# Patient Record
Sex: FEMALE | URBAN - METROPOLITAN AREA
[De-identification: names, ages, dates, MRNs, and addresses within clinical notes are randomized per-mention and may not be internally consistent; named-entity substitution may affect disease eponyms.]

---

## 2021-03-01 ENCOUNTER — OUTPATIENT (OUTPATIENT)
Dept: OUTPATIENT SERVICES | Facility: HOSPITAL | Age: 31
LOS: 1 days | End: 2021-03-01
Payer: MEDICARE

## 2021-03-01 DIAGNOSIS — Z20.828 CONTACT WITH AND (SUSPECTED) EXPOSURE TO OTHER VIRAL COMMUNICABLE DISEASES: ICD-10-CM

## 2021-03-01 LAB — SARS-COV-2 RNA SPEC QL NAA+PROBE: DETECTED

## 2021-03-01 PROCEDURE — C9803: CPT

## 2021-03-01 PROCEDURE — U0005: CPT

## 2021-03-01 PROCEDURE — U0003: CPT

## 2021-03-02 DIAGNOSIS — Z20.828 CONTACT WITH AND (SUSPECTED) EXPOSURE TO OTHER VIRAL COMMUNICABLE DISEASES: ICD-10-CM

## 2024-05-14 PROBLEM — Z00.00 ENCOUNTER FOR PREVENTIVE HEALTH EXAMINATION: Status: ACTIVE | Noted: 2024-05-14

## 2024-05-17 ENCOUNTER — APPOINTMENT (OUTPATIENT)
Dept: MATERNAL FETAL MEDICINE | Facility: CLINIC | Age: 34
End: 2024-05-17
Payer: COMMERCIAL

## 2024-05-17 ENCOUNTER — ASOB RESULT (OUTPATIENT)
Age: 34
End: 2024-05-17

## 2024-05-17 ENCOUNTER — APPOINTMENT (OUTPATIENT)
Dept: ANTEPARTUM | Facility: CLINIC | Age: 34
End: 2024-05-17
Payer: COMMERCIAL

## 2024-05-17 ENCOUNTER — NON-APPOINTMENT (OUTPATIENT)
Age: 34
End: 2024-05-17

## 2024-05-17 VITALS
SYSTOLIC BLOOD PRESSURE: 118 MMHG | RESPIRATION RATE: 17 BRPM | WEIGHT: 189 LBS | OXYGEN SATURATION: 99 % | DIASTOLIC BLOOD PRESSURE: 70 MMHG | BODY MASS INDEX: 34.78 KG/M2 | HEIGHT: 62 IN | HEART RATE: 84 BPM

## 2024-05-17 DIAGNOSIS — O99.281 ENDOCRINE, NUTRITIONAL AND METABOLIC DISEASES COMPLICATING PREGNANCY, FIRST TRIMESTER: ICD-10-CM

## 2024-05-17 DIAGNOSIS — D25.9 LEIOMYOMA OF UTERUS, UNSPECIFIED: ICD-10-CM

## 2024-05-17 DIAGNOSIS — Z78.9 OTHER SPECIFIED HEALTH STATUS: ICD-10-CM

## 2024-05-17 DIAGNOSIS — Z82.49 FAMILY HISTORY OF ISCHEMIC HEART DISEASE AND OTHER DISEASES OF THE CIRCULATORY SYSTEM: ICD-10-CM

## 2024-05-17 DIAGNOSIS — E27.8 OTHER SPECIFIED DISORDERS OF ADRENAL GLAND: ICD-10-CM

## 2024-05-17 DIAGNOSIS — E03.9 ENDOCRINE, NUTRITIONAL AND METABOLIC DISEASES COMPLICATING PREGNANCY, FIRST TRIMESTER: ICD-10-CM

## 2024-05-17 DIAGNOSIS — Z3A.09 9 WEEKS GESTATION OF PREGNANCY: ICD-10-CM

## 2024-05-17 DIAGNOSIS — N83.202 UNSPECIFIED OVARIAN CYST, LEFT SIDE: ICD-10-CM

## 2024-05-17 PROCEDURE — 76801 OB US < 14 WKS SINGLE FETUS: CPT

## 2024-05-17 PROCEDURE — 99205 OFFICE O/P NEW HI 60 MIN: CPT

## 2024-05-17 RX ORDER — FOLIC ACID/MULTIVIT,IRON,MINER 0.4MG-18MG
TABLET ORAL
Refills: 0 | Status: ACTIVE | COMMUNITY

## 2024-05-17 NOTE — VITALS
[LMP (date): ___] : LMP was on [unfilled] [GA =___ Weeks] : which calculates to a GA of [unfilled] weeks [GA= ___ Days] : and [unfilled] day(s) [KALYANI by LMP (date): ___] : The calculated KALYANI by LMP is [unfilled] [By LMP] : this is the final KALYANI

## 2024-05-17 NOTE — FAMILY HISTORY
[Age 35+ During Pregnancy] : not 35 or over during pregnancy [Reported Family History Of Birth Defects] : no congenital heart defects [Pepe-Sachs Carrier] : no Pepe-Sachs [Family History] : no mental retardation/autism [Reported Family History Of Genetic Disease] : no history of child defect in child of baby father

## 2024-05-20 NOTE — DATA REVIEWED
[FreeTextEntry1] :  TAUS/TVUS 5/17/24 - viable hoff IUP with CRL measuring consistent with LMP dating. Two separate cysts are seen in the left adnexal area. Complex appearing left cyst measuring 8.60 x 5.56 x 7.19 cm with internal echogenic debris. No increased color flow noted on Doppler imaging. Smaller left simple cyst is also appreciated measuring 2.15 x 1.64 x 1.50 cm. No increased color flow noted on Doppler imaging. There is a right adnexal cyst measuring 10.24 x 6.85 x 9.96 cm. Cystic fluid appears homogeneous with low level echogenicity. No increased color flow noted on Doppler imaging. Posterior myoma appreciated measuring 3.6 x 3.0 x 3.7 cm.

## 2024-05-20 NOTE — DISCUSSION/SUMMARY
[FreeTextEntry1] : At the time of consultation, we reviewed the result of her ultrasound evaluation to date.  Teagan was noted to have ovarian cysts and uterine fibroids noted at the time of pregnancy dating ultrasound.  She reports some pelvic pressure but denies pain, vaginal bleeding, or discharge. She reports no previous pelvic ultrasound performed outside of pregnancy. The prevalence of adnexal masses in pregnant women is 0.05-3.2% of live births. Malignancy is diagnosed in only 1.2-6.8% of pregnant patients with persistent masses. The general consensus regarding management of adnexal masses in pregnancy is to surgically resect asymptomatic masses that are present after the first trimester and are either >10 cm in diameter or are complex appearing (solid and cystic areas, papillary area, septae, etc.) as these features are more often associated with malignancy. Additionally, resection of large adnexal masses reduces the risk for complication such as adnexal torsion, rupture, or obstruction of labor. We did review that larger masses may be less likely to torse as they become fixed in the pelvis by the growing uterus. As she has one mass that measures >10 cm and another that has complex features, she may be candidate for surgical intervention.  We reviewed that the visualized fibroid does not require surgical intervention at this time. Teagan may also opt for expectant management plan for surgical intervention after delivery, which is a reason alternative.  Surgical interval would be considering in the  period if there is a change in mass size or character or with maternal symptoms; however, this may result in emergent need for surgical intervention at a later gestational age with increased risk for fetal complication or  delivery.    We reviewed the optimal time for surgery during pregnancy is in the early second trimester. This is outside the window where there is an increased risk for first trimester pregnancy loss.  Additionally, the results of genetic screening or testing are available.  Organogenesis is complete and the hormonal function of the corpus lutuem has been replaced by the placenta. Surgery is usually performed by a gynecologic oncologist and may be achieved by either a laparoscopic or open approach.  She is scheduled [place] [facility]. Ancillary imaging, including MRI, is generally not needed but may be requested for surgical planning.  We reviewed the safety of MRI in pregnancy and optimal timing in the second trimester. We also reviewed anesthesia considerations with pregnancy and the need for anesthesia consultation should she opt to purse surgery.  All questions were answered to the best of my ability. At this time, she will proceed with GYNONC consultation as scheduled. She is scheduled for NT evaluation at her OB office and will pursue genetic screening at that time.  She will return to our office in 5 weeks (14 weeks gestation) for repeat ultrasound evaluation and follow up consultation.   For completion, we reviewed her history of hypothyroidism currently treated with levothyroxine 125 mcg daily. This dose was recently increased in April.  She is clinically euthyroid and reports routine care with her Endocrinologist. Well-controlled hypothyroid disorders carry no increased risk of pregnancy complication.  Untreated hypothyroidism or disease with suboptimal control can have adverse effects on the mother and child, depending upon the biochemical severity of the hypothyroidism. Thyroid replacement may need to be adjusted during the course of the pregnancy as the volume of distribution increases.  TSH should be assessed every 4-6 weeks and after any change made in her levothyroxine dose.   TSH should be maintained within the following trimester-specific goal ranges: 0.1-2.5 uIU/mL in the first trimester, 0.2-3 uIU/mL in the second trimester and 0.3-3 uIU/mL in the third trimester. The patient voiced understanding of the above counseling and recommendations.

## 2024-05-20 NOTE — OB HISTORY
[LMP: ___] : LMP: [unfilled] [KALYANI: ___] : KALYANI: [unfilled] [EGA: ___ wks] : EGA: [unfilled] wks [Spontaneous] : Spontaneous conception [Sonogram] : sonogram [at ___ wks] : at [unfilled] weeks [Definite:  ___ (Date)] : the last menstrual period was [unfilled] [FreeTextEntry1] : Newly diagnosed 8.8 cm complex left ovarian cyst, 10 cm adnexal mass, and 3.5 cm fibroid at the time of dating ultrasound Synthroid 125 mcg daily for hypothyroidism

## 2024-05-20 NOTE — HISTORY OF PRESENT ILLNESS
[FreeTextEntry1] : Teagan Diaz is a 34 y.o.  with LMP of 3/15/24 and KALYANI of 24 who presents at 9w0d for  consultation secondary to pregnancy complicated by bilateral ovarian cysts, uterine fibroid, and hypothyroidism. Her BMI is 34.5 kg/m2. Teagan was noted to have ovarian cysts and uterine fibroid at the time of routine dating ultrasound. She is asymptomatic. She was diagnosed with hypothyroidism at the age of 29 and is currently treated with levothyroxine 125 mcg daily. She receives routine care with Endocrinology. On the day of consultation, Teagan feels well and reports no constitutional or obstetric complaint.

## 2024-05-20 NOTE — CHIEF COMPLAINT
[G ___] : G [unfilled] [P ___] : P [unfilled] [de-identified] : pregnancy complicated by bilateral ovarian cysts, uterine fibroid, and hypothyroidism

## 2024-05-24 NOTE — PHYSICAL EXAM
[Chaperone Present] : A chaperone was present in the examining room during all aspects of the physical examination [Absent] : CVAT: absent [Normal] : Recto-Vaginal Exam: Normal [Fully active, able to carry on all pre-disease performance without restriction] : Status 0 - Fully active, able to carry on all pre-disease performance without restriction

## 2024-05-29 ENCOUNTER — APPOINTMENT (OUTPATIENT)
Dept: GYNECOLOGIC ONCOLOGY | Facility: CLINIC | Age: 34
End: 2024-05-29
Payer: COMMERCIAL

## 2024-05-29 ENCOUNTER — NON-APPOINTMENT (OUTPATIENT)
Age: 34
End: 2024-05-29

## 2024-05-29 VITALS
HEART RATE: 84 BPM | HEIGHT: 62 IN | WEIGHT: 189 LBS | SYSTOLIC BLOOD PRESSURE: 116 MMHG | BODY MASS INDEX: 34.78 KG/M2 | DIASTOLIC BLOOD PRESSURE: 78 MMHG

## 2024-05-29 PROCEDURE — 99204 OFFICE O/P NEW MOD 45 MIN: CPT

## 2024-05-30 NOTE — HISTORY OF PRESENT ILLNESS
[FreeTextEntry1] : Referring MD/MFM - Dr. Laura Gonzalez GYN - Dr. Gerson Martines PCP - Dr. Elissa Nelson    Ms. Diaz is a 33 yo  who presents for initial consultation at the request of Dr. Gonzalez for the management of a pelvic mass. She is currently 10 5/7 weeks pregnant with KALYANI 24. Prenatal imaging revealed bilateral adnexal masses as below. She presents for further management.   IMAGING: TVUS on 24: viable single IUP. Two separate cysts seen in the left adnexal area. Complex appearing left cyst 8.6 x 5.56 x 7.19 cm with internal echogenic debris. No increased flow. Small left simple cyst 2.15 x 1.64 x 1.5 cm. No increased flow. There is a right adnexal cyst 10.24 x 6.85 x 9.96 cm. Cystic fluid with homogenous low-level echogenicity. No increased flow. Posterior myoma 3.6 x 3.0 x 3.7 cm.   PMHx: hypothyroidism PSHx: N/A Fam Hx: N/A   HCM: Mammogram: never  Colonoscopy: never COVID-19 vaccine series completed

## 2024-05-30 NOTE — PLAN
[TextEntry] : Bilateral adnexal masses in pregnancy.  Images reviewed from patient's portal. Appear to be large unilocular cysts although I cannot rule out a mural nodule on the left cyst.  Discussed DDx in detail with patient and her .  Discussed role of surgery in pregnancy and its timing.  Will plan for repeat imaging at approximately 13 week and make a decision regarding surgery.

## 2024-05-30 NOTE — DISCUSSION/SUMMARY
[Reviewed Clinical Lab Test(s)] : Results of clinical tests were reviewed. [Reviewed Radiology Report(s)] : Radiology reports were reviewed. [Reviewed Radiology Film/Image(s)] : Images from radiology studies were reviewed and examined. [Discuss Alternatives/Risks/Benefits w/Patient] : All alternatives, risks, and benefits were discussed with the patient/family and all questions were answered.  Patient expressed good understanding and appreciates the importance of follow up as recommended. [FreeTextEntry1] : I sat down with Teagan and her  and reviewed the differential diagnosis of an adnexal mass in detail including benign, borderline and malignant ovarian neoplasms. Review of radiologic imaging suggests a benign process. Definitive diagnosis can only be obtained through surgical excision. Laparoscopy and excision of the adnexal cyst or adnexa with frozen section diagnosis may be recommended for treatment of a mass.  Surgical staging is performed if an ovarian malignancy is diagnosed. I described the staging of ovarian cancer and the importance of surgical staging results for determining prognosis and treatment. She understands that comprehensive staging for ovarian cancer typically includes hysterectomy, bilateral salpingoopherectomy, retroperitoneal lymph node dissection, omentectomy and peritoneal biopsies and may require conversion to laparotomy. We reviewed indications for fertility preserving surgical staging and indications and contraindications for this approach. The risks and benefits of surgery were discussed in detail, including but not limited to risks of bleeding, infection, poor wound healing, venous thromboembolism and intraoperative injury. We discussed expectations for postoperative recuperation after laparoscopy and laparotomy. Alternatives to surgery, namely expectant management, were reviewed. We discussed potential risks of surgery during pregnancy and ideal timing and approach to such.

## 2024-05-31 ENCOUNTER — APPOINTMENT (OUTPATIENT)
Dept: ANTEPARTUM | Facility: CLINIC | Age: 34
End: 2024-05-31

## 2024-05-31 ENCOUNTER — APPOINTMENT (OUTPATIENT)
Dept: MATERNAL FETAL MEDICINE | Facility: CLINIC | Age: 34
End: 2024-05-31

## 2024-06-20 ENCOUNTER — APPOINTMENT (OUTPATIENT)
Dept: ANTEPARTUM | Facility: CLINIC | Age: 34
End: 2024-06-20
Payer: COMMERCIAL

## 2024-06-20 ENCOUNTER — ASOB RESULT (OUTPATIENT)
Age: 34
End: 2024-06-20

## 2024-06-20 ENCOUNTER — APPOINTMENT (OUTPATIENT)
Dept: MATERNAL FETAL MEDICINE | Facility: CLINIC | Age: 34
End: 2024-06-20
Payer: COMMERCIAL

## 2024-06-20 DIAGNOSIS — O26.899 OTHER SPECIFIED PREGNANCY RELATED CONDITIONS, UNSPECIFIED TRIMESTER: ICD-10-CM

## 2024-06-20 DIAGNOSIS — O34.10 MATERNAL CARE FOR BENIGN TUMOR OF CORPUS UTERI, UNSPECIFIED TRIMESTER: ICD-10-CM

## 2024-06-20 DIAGNOSIS — N94.89 OTHER SPECIFIED CONDITIONS ASSOCIATED WITH FEMALE GENITAL ORGANS AND MENSTRUAL CYCLE: ICD-10-CM

## 2024-06-20 DIAGNOSIS — O99.210 OBESITY COMPLICATING PREGNANCY, UNSPECIFIED TRIMESTER: ICD-10-CM

## 2024-06-20 DIAGNOSIS — E03.9 ENDOCRINE, NUTRITIONAL AND METABOLIC DISEASES COMPLICATING PREGNANCY, UNSPECIFIED TRIMESTER: ICD-10-CM

## 2024-06-20 DIAGNOSIS — R19.00 OTHER SPECIFIED PREGNANCY RELATED CONDITIONS, UNSPECIFIED TRIMESTER: ICD-10-CM

## 2024-06-20 DIAGNOSIS — D25.9 MATERNAL CARE FOR BENIGN TUMOR OF CORPUS UTERI, UNSPECIFIED TRIMESTER: ICD-10-CM

## 2024-06-20 DIAGNOSIS — O99.280 ENDOCRINE, NUTRITIONAL AND METABOLIC DISEASES COMPLICATING PREGNANCY, UNSPECIFIED TRIMESTER: ICD-10-CM

## 2024-06-20 DIAGNOSIS — Z3A.14 14 WEEKS GESTATION OF PREGNANCY: ICD-10-CM

## 2024-06-20 PROCEDURE — 76801 OB US < 14 WKS SINGLE FETUS: CPT

## 2024-06-20 PROCEDURE — 99215 OFFICE O/P EST HI 40 MIN: CPT

## 2024-06-20 RX ORDER — LEVOTHYROXINE SODIUM 137 UG/1
137 TABLET ORAL
Refills: 0 | Status: ACTIVE | COMMUNITY

## 2024-06-23 PROBLEM — O34.10 UTERINE FIBROID DURING PREGNANCY, ANTEPARTUM: Status: ACTIVE | Noted: 2024-05-17

## 2024-06-23 PROBLEM — O26.899 PELVIC MASS DURING PREGNANCY: Status: ACTIVE | Noted: 2024-05-24

## 2024-06-23 PROBLEM — O99.210 OBESITY IN PREGNANCY: Status: ACTIVE | Noted: 2024-05-17

## 2024-06-23 PROBLEM — O99.280 HYPOTHYROIDISM IN PREGNANCY: Status: ACTIVE | Noted: 2024-05-17

## 2024-06-23 PROBLEM — Z3A.14 14 WEEKS GESTATION OF PREGNANCY: Status: ACTIVE | Noted: 2024-06-20

## 2024-06-23 PROBLEM — N94.89 ADNEXAL MASS: Status: ACTIVE | Noted: 2024-05-17

## 2024-06-23 NOTE — OB HISTORY
[LMP: ___] : LMP: [unfilled] [KALYANI: ___] : KALYANI: [unfilled] [EGA: ___ wks] : EGA: [unfilled] wks [Spontaneous] : Spontaneous conception [at ___ wks] : at [unfilled] weeks [Sonogram] : sonogram [Definite:  ___ (Date)] : the last menstrual period was [unfilled] [FreeTextEntry1] : Bilateral adnexal cysts noted at time of dating ultrasound MFM consultation 5/17/24 GYNONC consultation 5/29/24 Synthroid increased to 137 mcg daily for hypothyroidism

## 2024-06-23 NOTE — HISTORY OF PRESENT ILLNESS
[FreeTextEntry1] : Teagan Diaz is a 34 y.o.  with LMP of 3/15/24 and KALYANI of 24 who presents at 14w0d for follow up  consultation secondary to pregnancy complicated by bilateral ovarian cysts, uterine fibroid, and hypothyroidism. Her BMI is 34.5 kg/m2. Teagan reports some irregular left sided pain appreciated primarily at night. No vaginal bleeding or discharge.  She is otherwise asymptomatic. She was diagnosed with hypothyroidism at the age of 29 and has routine follow up with Endocrinology. Her levothyroxine replacement was increased to 137 mcg daily. On the day of consultation, Teagan feels well and reports no constitutional or obstetric complaint.

## 2024-06-23 NOTE — DATA REVIEWED
[FreeTextEntry1] : TAUS 6/20/24 - viable hoff IUP with CRL measuring consistent with LMP dating, NT measures 2.3 mm. Complex appearing left cyst measuring 7.3 x 5.1 x 9.2 cm with internal echogenic debris. No increased color flow noted on Doppler imaging. Right adnexal cyst measuring  8.7 x 7.5 x 9.2 cm. Cystic fluid appears homogeneous with low level echogenicity. No increased color flow noted on Doppler imaging. Posterior myoma appreciated measuring 4.3 x 4.1 x 4.5 cm.

## 2024-06-23 NOTE — ACTIVE PROBLEMS
[Diabetes Mellitus] : no diabetes mellitus [Heart Disease] : no heart disease [Hypertension] : no hypertension [Autoimmune Disease] : no autoimmune disease [Renal Disease] : no kidney disease, no UTI [Psychiatric Disorders] : no psychiatric disorders [Neurologic Disorder] : no neurologic disorder, no epilepsy [Depression] : no depression, no post partum depression [Hepatic Disorder] : no hepatitis, no liver disease [Thrombophlebitis] : no varicosities, no phlebitis [Thyroid Disorder] : no thyroid dysfunction [Trauma] : no trauma/violence [Blood Transfusion (___ Ml)] : no history of blood transfusion

## 2024-06-23 NOTE — CHIEF COMPLAINT
[G ___] : G [unfilled] [P ___] : P [unfilled] [de-identified] : pregnancy complicated by bilateral ovarian cysts, uterine fibroid, and hypothyroidism

## 2024-06-23 NOTE — PAST MEDICAL HISTORY
[HIV Infection] : no HIV [Exposure To Gonorrhea] : no gonorrhea [Chlamydial Infections] : no chlamydia [Herpes Simplex] : no genital herpes [Syphilis] : no syphilis [Human Papilloma Virus Infection] : no genital warts [Hepatitis, B Virus] : no Hepatitis B [Trichomoniasis] : no trichomoniasis [Hepatitis, C Virus] : no Hepatitis C

## 2024-06-23 NOTE — DISCUSSION/SUMMARY
[FreeTextEntry1] : At the time of follow up consultation, we reviewed today's ultrasound report which again show bilateral adnexal cysts who appear unchanged from prior imaging. The posterior fibroid has increased minimally in size. She reports some left sided pain but is otherwise asymptomatic. She otherwise reports no cramping, vaginal bleeding, or discharge. We again reviewed the general consensus regarding management of adnexal masses in pregnancy is to surgically resect asymptomatic masses that are present after the first trimester and are either >10 cm in diameter or are complex appearing (solid and cystic areas, papillary area, septae, etc.). Resection of large adnexal masses reduces the risk for complication such as adnexal torsion, rupture, or obstruction of labor. We did review that larger masses may be less likely to torse as they become fixed in the pelvis by the growing uterus. This information was also reviewed with her at the time of GYNONC consultation.  She understands that she remains a candidate for surgical intervention and optimal timing was reviewed. We reviewed that the visualized fibroid does not require surgical intervention at this time. Teagan may also opt for expectant management plan for surgical intervention after delivery, which is a reasonable alternative.  Surgical interval would be considered in the  period if there is a change in mass size or character or with maternal symptoms; however, this may result in emergent need for surgical intervention at a later gestational age with increased risk for fetal complication or  delivery.    We reviewed the optimal time for surgery during pregnancy is in the early second trimester., which is her current gestational age. This is outside the window where there is an increased risk for first trimester pregnancy loss.  Additionally, the results of genetic screening or testing are available.  Organogenesis is complete and the hormonal function of the corpus luteum has been replaced by the placenta. Surgery is usually performed by a gynecologic oncologist and may be achieved by either a laparoscopic or open approach.  She has met with Dr. Burns in the [place] and requests second opinion from an [facility] physician as it is closer to her home. Referral was provided. Anesthesia consultation is also recommended should she opt to pursue surgery.  All questions were answered to the best of my ability. At this time, she would like to continue expectant management and have follow up GYNONC consultation. She will return to our office in 3 weeks for repeat ultrasound evaluation and follow up consultation and at 20 weeks for comprehensive fetal anatomy evaluation.   Finally, Teagan has a history of hypothyroidism and her levothyroxine dose was recently increased to 137 mcg daily.She is clinically euthyroid and reports routine care with her Endocrinologist. TSH should be assessed every 4-6 weeks and after any change made in her levothyroxine dose.   TSH should be maintained within the following trimester-specific goal ranges: 0.1-2.5 uIU/mL in the first trimester, 0.2-3 uIU/mL in the second trimester and 0.3-3 uIU/mL in the third trimester. The patient voiced understanding of the above counseling and recommendations. All questions were answered to the best of my ability.

## 2024-06-26 ENCOUNTER — APPOINTMENT (OUTPATIENT)
Dept: GYNECOLOGIC ONCOLOGY | Facility: CLINIC | Age: 34
End: 2024-06-26

## 2024-06-26 VITALS
HEART RATE: 96 BPM | WEIGHT: 189 LBS | OXYGEN SATURATION: 99 % | HEIGHT: 62 IN | DIASTOLIC BLOOD PRESSURE: 82 MMHG | BODY MASS INDEX: 34.78 KG/M2 | SYSTOLIC BLOOD PRESSURE: 137 MMHG | RESPIRATION RATE: 16 BRPM

## 2024-06-26 DIAGNOSIS — O34.80 MATERNAL CARE FOR OTHER ABNORMALITIES OF PELVIC ORGANS, UNSPECIFIED TRIMESTER: ICD-10-CM

## 2024-06-26 DIAGNOSIS — N83.209 MATERNAL CARE FOR OTHER ABNORMALITIES OF PELVIC ORGANS, UNSPECIFIED TRIMESTER: ICD-10-CM

## 2024-06-26 PROCEDURE — 99214 OFFICE O/P EST MOD 30 MIN: CPT

## 2024-06-26 PROCEDURE — 99459 PELVIC EXAMINATION: CPT

## 2024-07-02 ENCOUNTER — APPOINTMENT (OUTPATIENT)
Dept: MRI IMAGING | Facility: CLINIC | Age: 34
End: 2024-07-02

## 2024-07-05 ENCOUNTER — APPOINTMENT (OUTPATIENT)
Dept: GYNECOLOGIC ONCOLOGY | Facility: CLINIC | Age: 34
End: 2024-07-05

## 2024-07-11 ENCOUNTER — APPOINTMENT (OUTPATIENT)
Dept: ANTEPARTUM | Facility: CLINIC | Age: 34
End: 2024-07-11
Payer: COMMERCIAL

## 2024-07-11 ENCOUNTER — ASOB RESULT (OUTPATIENT)
Age: 34
End: 2024-07-11

## 2024-07-11 ENCOUNTER — APPOINTMENT (OUTPATIENT)
Dept: MATERNAL FETAL MEDICINE | Facility: CLINIC | Age: 34
End: 2024-07-11
Payer: COMMERCIAL

## 2024-07-11 VITALS
WEIGHT: 190 LBS | SYSTOLIC BLOOD PRESSURE: 108 MMHG | HEIGHT: 62 IN | DIASTOLIC BLOOD PRESSURE: 62 MMHG | RESPIRATION RATE: 16 BRPM | OXYGEN SATURATION: 99 % | BODY MASS INDEX: 34.96 KG/M2 | HEART RATE: 91 BPM

## 2024-07-11 DIAGNOSIS — R19.00 OTHER SPECIFIED PREGNANCY RELATED CONDITIONS, UNSPECIFIED TRIMESTER: ICD-10-CM

## 2024-07-11 DIAGNOSIS — O99.210 OBESITY COMPLICATING PREGNANCY, UNSPECIFIED TRIMESTER: ICD-10-CM

## 2024-07-11 DIAGNOSIS — N94.89 OTHER SPECIFIED CONDITIONS ASSOCIATED WITH FEMALE GENITAL ORGANS AND MENSTRUAL CYCLE: ICD-10-CM

## 2024-07-11 DIAGNOSIS — O34.80 MATERNAL CARE FOR OTHER ABNORMALITIES OF PELVIC ORGANS, UNSPECIFIED TRIMESTER: ICD-10-CM

## 2024-07-11 DIAGNOSIS — N83.209 MATERNAL CARE FOR OTHER ABNORMALITIES OF PELVIC ORGANS, UNSPECIFIED TRIMESTER: ICD-10-CM

## 2024-07-11 DIAGNOSIS — O26.899 OTHER SPECIFIED PREGNANCY RELATED CONDITIONS, UNSPECIFIED TRIMESTER: ICD-10-CM

## 2024-07-11 DIAGNOSIS — O34.10 MATERNAL CARE FOR BENIGN TUMOR OF CORPUS UTERI, UNSPECIFIED TRIMESTER: ICD-10-CM

## 2024-07-11 DIAGNOSIS — O99.280 ENDOCRINE, NUTRITIONAL AND METABOLIC DISEASES COMPLICATING PREGNANCY, UNSPECIFIED TRIMESTER: ICD-10-CM

## 2024-07-11 DIAGNOSIS — D25.9 MATERNAL CARE FOR BENIGN TUMOR OF CORPUS UTERI, UNSPECIFIED TRIMESTER: ICD-10-CM

## 2024-07-11 DIAGNOSIS — E03.9 ENDOCRINE, NUTRITIONAL AND METABOLIC DISEASES COMPLICATING PREGNANCY, UNSPECIFIED TRIMESTER: ICD-10-CM

## 2024-07-11 PROCEDURE — 99215 OFFICE O/P EST HI 40 MIN: CPT

## 2024-07-11 PROCEDURE — 76817 TRANSVAGINAL US OBSTETRIC: CPT

## 2024-07-11 PROCEDURE — 76815 OB US LIMITED FETUS(S): CPT

## 2024-07-12 ENCOUNTER — APPOINTMENT (OUTPATIENT)
Dept: GYNECOLOGIC ONCOLOGY | Facility: CLINIC | Age: 34
End: 2024-07-12

## 2024-07-12 PROCEDURE — 99441: CPT | Mod: 93

## 2024-07-15 ENCOUNTER — APPOINTMENT (OUTPATIENT)
Dept: MRI IMAGING | Facility: CLINIC | Age: 34
End: 2024-07-15
Payer: COMMERCIAL

## 2024-07-15 ENCOUNTER — OUTPATIENT (OUTPATIENT)
Dept: OUTPATIENT SERVICES | Facility: HOSPITAL | Age: 34
LOS: 1 days | End: 2024-07-15

## 2024-07-15 DIAGNOSIS — O34.80 MATERNAL CARE FOR OTHER ABNORMALITIES OF PELVIC ORGANS, UNSPECIFIED TRIMESTER: ICD-10-CM

## 2024-07-15 PROCEDURE — 72195 MRI PELVIS W/O DYE: CPT | Mod: 26

## 2024-07-18 ENCOUNTER — NON-APPOINTMENT (OUTPATIENT)
Age: 34
End: 2024-07-18

## 2024-07-29 ENCOUNTER — APPOINTMENT (OUTPATIENT)
Dept: GYNECOLOGIC ONCOLOGY | Facility: CLINIC | Age: 34
End: 2024-07-29
Payer: COMMERCIAL

## 2024-07-29 DIAGNOSIS — N94.89 OTHER SPECIFIED CONDITIONS ASSOCIATED WITH FEMALE GENITAL ORGANS AND MENSTRUAL CYCLE: ICD-10-CM

## 2024-07-29 PROCEDURE — 99214 OFFICE O/P EST MOD 30 MIN: CPT

## 2024-07-29 PROCEDURE — 99459 PELVIC EXAMINATION: CPT

## 2024-07-29 NOTE — ASSESSMENT
[FreeTextEntry1] : Pt is a 35 yo currently 19 wks pregnant with KALYANI of 12/20/24 presenting today for bilateral ovarian cysts. We discussed MRI results and patient asking for some guidance. She is stressed about the results and would like to know what decision to make.   I discussed that while imaging is not definitive, my gut feeling is that this does not represent a high grade cancer, but likely a borderline tumor or possibly just a cystadenofibroma, which is completely benign. Given that she did not have any other imaging prior to pregnancy we do not know how long she has had these cysts. We discussed a <3% risk of torsion while in pregnancy and I do not believe the cysts will interfere with pregnancy.   Risk of surgery would also be infetion and loosing the pregnancy and I would recommend a right oophorectomy and left cystectomy. If she waits and we monitor the cyst we could consider a cystectomy of the right ovary after delivery and given the appearance I would favor more conservative treatment. We did discuss risk of waiting with growth of tumor, but I believe risk of spread if this represents cancer is low as the tumor appears to be confined to one ovary and only a single nodular area. I think the predominant possibility is this is a benign tumor.   The patient and  are present and in agreement with management, they understand risk of waiting with possibility of a malignancy on the ovary, but also understand the risk of surgery at this time. Given my opinion is that risk of a high grade malignancy is low I do not recommend aggressive surgical management.

## 2024-07-29 NOTE — PHYSICAL EXAM
[Chaperone Present] : A chaperone was present in the examining room during all aspects of the physical examination [23466] : A chaperone was present during the pelvic exam. [FreeTextEntry2] : Michaela Zambrano MA was present the entire duration of the patient interaction and gynecological exam. [Normal] : Mood and affect: Normal [Fully active, able to carry on all pre-disease performance without restriction] : Status 0 - Fully active, able to carry on all pre-disease performance without restriction

## 2024-07-29 NOTE — REASON FOR VISIT
[FreeTextEntry1] : [facility] [place]. [phone] [address] [place] [address]

## 2024-07-29 NOTE — PHYSICAL EXAM
[Chaperone Present] : A chaperone was present in the examining room during all aspects of the physical examination [71137] : A chaperone was present during the pelvic exam. [FreeTextEntry2] : Michaela Zambrano MA was present the entire duration of the patient interaction and gynecological exam. [Normal] : Mood and affect: Normal [Fully active, able to carry on all pre-disease performance without restriction] : Status 0 - Fully active, able to carry on all pre-disease performance without restriction

## 2024-07-29 NOTE — HISTORY OF PRESENT ILLNESS
[FreeTextEntry1] : Pt is a 33 yo currently 19 wks pregnant with KALYANI of 12/20/24 presenting today for bilateral ovarian cysts. Patient had a telehealth on 7/12/24 to review MRI results however she did not get MRI done because the [facility] was not able to perform it on pregnant women. She had a [facility] appointment on 7/15. I advised patient that one of my colleagues would be able to review results with her in my absence.   MRI pelvis 7/15/24: IMPRESSION: Bilateral complex ovarian cystic masses which do not contain fat; imaging features are not consistent with dermoids. Differential includes cystic neoplasms. Evaluation of the wall thickening/nodularity is limited without IV contrast.  Dr. Barrett called patient to review results, She discussed with patient that while stable size is overall reassuring, nodularity and complexity of these lesions does raise some concern. Malignancy cannot be ruled out without removal of the mass. Preoperative biopsy is not feasible as needle biopsy risks rupture of the mass. If a malignancy were present, this would risk spill of tumor cells into the abdominal cavity that increases the risk of recurrence and potentially lead to the need for chemotherapy. Although overall suspicion for malignancy low, repeat imaging in a few weeks surpasses the ideal surgical window (2nd trimester). Pt very torn about how to proceed. Reassurance attempted with risks/benefits of both decisions. Pt was advised to continue to think and discuss with partner. She presents today for follow up to review plan.   The patient reports having some pelvic discomfort but overall doing well and has no new concerns today.

## 2024-07-29 NOTE — END OF VISIT
[FreeTextEntry3] : Patient agrees with conservative management in pregnancy and will follow up if there is any concerns on follow up US otherwise will see her post-delivery to plan surgery

## 2024-07-29 NOTE — REASON FOR VISIT
[FreeTextEntry1] : [facility] [place]. [phone] [address] [place] [address]

## 2024-07-29 NOTE — HISTORY OF PRESENT ILLNESS
[FreeTextEntry1] : Pt is a 33 yo currently 19 wks pregnant with KALYANI of 12/20/24 presenting today for bilateral ovarian cysts. Patient had a telehealth on 7/12/24 to review MRI results however she did not get MRI done because the [facility] was not able to perform it on pregnant women. She had a [facility] appointment on 7/15. I advised patient that one of my colleagues would be able to review results with her in my absence.   MRI pelvis 7/15/24: IMPRESSION: Bilateral complex ovarian cystic masses which do not contain fat; imaging features are not consistent with dermoids. Differential includes cystic neoplasms. Evaluation of the wall thickening/nodularity is limited without IV contrast.  Dr. Barrett called patient to review results, She discussed with patient that while stable size is overall reassuring, nodularity and complexity of these lesions does raise some concern. Malignancy cannot be ruled out without removal of the mass. Preoperative biopsy is not feasible as needle biopsy risks rupture of the mass. If a malignancy were present, this would risk spill of tumor cells into the abdominal cavity that increases the risk of recurrence and potentially lead to the need for chemotherapy. Although overall suspicion for malignancy low, repeat imaging in a few weeks surpasses the ideal surgical window (2nd trimester). Pt very torn about how to proceed. Reassurance attempted with risks/benefits of both decisions. Pt was advised to continue to think and discuss with partner. She presents today for follow up to review plan.   The patient reports having some pelvic discomfort but overall doing well and has no new concerns today.

## 2024-08-01 ENCOUNTER — APPOINTMENT (OUTPATIENT)
Dept: ANTEPARTUM | Facility: CLINIC | Age: 34
End: 2024-08-01
Payer: COMMERCIAL

## 2024-08-01 ENCOUNTER — ASOB RESULT (OUTPATIENT)
Age: 34
End: 2024-08-01

## 2024-08-01 PROCEDURE — 76811 OB US DETAILED SNGL FETUS: CPT

## 2024-08-01 PROCEDURE — 76817 TRANSVAGINAL US OBSTETRIC: CPT

## 2024-08-15 ENCOUNTER — ASOB RESULT (OUTPATIENT)
Age: 34
End: 2024-08-15

## 2024-08-15 ENCOUNTER — APPOINTMENT (OUTPATIENT)
Dept: ANTEPARTUM | Facility: CLINIC | Age: 34
End: 2024-08-15
Payer: COMMERCIAL

## 2024-08-15 PROCEDURE — 76816 OB US FOLLOW-UP PER FETUS: CPT

## 2024-09-20 ENCOUNTER — ASOB RESULT (OUTPATIENT)
Age: 34
End: 2024-09-20

## 2024-09-20 ENCOUNTER — APPOINTMENT (OUTPATIENT)
Dept: ANTEPARTUM | Facility: CLINIC | Age: 34
End: 2024-09-20
Payer: COMMERCIAL

## 2024-09-20 PROCEDURE — 76816 OB US FOLLOW-UP PER FETUS: CPT

## 2024-09-24 ENCOUNTER — APPOINTMENT (OUTPATIENT)
Dept: OBGYN | Facility: CLINIC | Age: 34
End: 2024-09-24
Payer: COMMERCIAL

## 2024-09-24 VITALS
HEIGHT: 62 IN | WEIGHT: 198.13 LBS | BODY MASS INDEX: 36.46 KG/M2 | DIASTOLIC BLOOD PRESSURE: 78 MMHG | SYSTOLIC BLOOD PRESSURE: 120 MMHG

## 2024-09-24 DIAGNOSIS — E03.9 ENDOCRINE, NUTRITIONAL AND METABOLIC DISEASES COMPLICATING PREGNANCY, UNSPECIFIED TRIMESTER: ICD-10-CM

## 2024-09-24 DIAGNOSIS — O99.280 ENDOCRINE, NUTRITIONAL AND METABOLIC DISEASES COMPLICATING PREGNANCY, UNSPECIFIED TRIMESTER: ICD-10-CM

## 2024-09-24 DIAGNOSIS — N83.209 MATERNAL CARE FOR OTHER ABNORMALITIES OF PELVIC ORGANS, UNSPECIFIED TRIMESTER: ICD-10-CM

## 2024-09-24 DIAGNOSIS — Z11.3 ENCOUNTER FOR SCREENING FOR INFECTIONS WITH A PREDOMINANTLY SEXUAL MODE OF TRANSMISSION: ICD-10-CM

## 2024-09-24 DIAGNOSIS — O09.32 SUPERVISION OF PREGNANCY WITH INSUFFICIENT ANTENATAL CARE, SECOND TRIMESTER: ICD-10-CM

## 2024-09-24 DIAGNOSIS — O34.80 MATERNAL CARE FOR OTHER ABNORMALITIES OF PELVIC ORGANS, UNSPECIFIED TRIMESTER: ICD-10-CM

## 2024-09-24 PROCEDURE — 36415 COLL VENOUS BLD VENIPUNCTURE: CPT

## 2024-09-24 PROCEDURE — 0500F INITIAL PRENATAL CARE VISIT: CPT

## 2024-09-27 ENCOUNTER — ASOB RESULT (OUTPATIENT)
Age: 34
End: 2024-09-27

## 2024-09-27 ENCOUNTER — APPOINTMENT (OUTPATIENT)
Dept: ANTEPARTUM | Facility: CLINIC | Age: 34
End: 2024-09-27
Payer: COMMERCIAL

## 2024-09-27 PROCEDURE — 76819 FETAL BIOPHYS PROFIL W/O NST: CPT

## 2024-09-30 LAB
ABO + RH PNL BLD: NORMAL
APPEARANCE: CLEAR
BACTERIA UR CULT: NORMAL
BASOPHILS # BLD AUTO: 0.03 K/UL
BASOPHILS NFR BLD AUTO: 0.3 %
BILIRUBIN URINE: NEGATIVE
BLD GP AB SCN SERPL QL: NORMAL
BLOOD URINE: NEGATIVE
C TRACH RRNA SPEC QL NAA+PROBE: NOT DETECTED
COLOR: YELLOW
EOSINOPHIL # BLD AUTO: 0.06 K/UL
EOSINOPHIL NFR BLD AUTO: 0.7 %
ESTIMATED AVERAGE GLUCOSE: 85 MG/DL
GLUCOSE QUALITATIVE U: NEGATIVE
HBA1C MFR BLD HPLC: 4.6 %
HBV SURFACE AG SER QL: NONREACTIVE
HCT VFR BLD CALC: 36.1 %
HCV AB SER QL: NONREACTIVE
HCV S/CO RATIO: 0.07 S/CO
HGB BLD-MCNC: 12.5 G/DL
HIV1+2 AB SPEC QL IA.RAPID: NONREACTIVE
IMM GRANULOCYTES NFR BLD AUTO: 0.9 %
KETONES URINE: NEGATIVE
LEUKOCYTE ESTERASE URINE: ABNORMAL
LYMPHOCYTES # BLD AUTO: 1.68 K/UL
LYMPHOCYTES NFR BLD AUTO: 18.9 %
M TB IFN-G BLD-IMP: NEGATIVE
MAN DIFF?: NORMAL
MCHC RBC-ENTMCNC: 30.3 PG
MCHC RBC-ENTMCNC: 34.6 GM/DL
MCV RBC AUTO: 87.6 FL
MEV IGG FLD QL IA: 214 AU/ML
MEV IGG+IGM SER-IMP: POSITIVE
MONOCYTES # BLD AUTO: 0.79 K/UL
MONOCYTES NFR BLD AUTO: 8.9 %
N GONORRHOEA RRNA SPEC QL NAA+PROBE: NOT DETECTED
NEUTROPHILS # BLD AUTO: 6.24 K/UL
NEUTROPHILS NFR BLD AUTO: 70.3 %
NITRITE URINE: NEGATIVE
PH URINE: 6
PLATELET # BLD AUTO: 214 K/UL
PROTEIN URINE: NEGATIVE
QUANTIFERON TB PLUS MITOGEN MINUS NIL: 1.22 IU/ML
QUANTIFERON TB PLUS NIL: 0.02 IU/ML
QUANTIFERON TB PLUS TB1 MINUS NIL: 0 IU/ML
QUANTIFERON TB PLUS TB2 MINUS NIL: 0 IU/ML
RBC # BLD: 4.12 M/UL
RBC # FLD: 13.2 %
RUBV IGG FLD-ACNC: 5 INDEX
RUBV IGG SER-IMP: POSITIVE
SOURCE AMPLIFICATION: NORMAL
SPECIFIC GRAVITY URINE: 1.01
T PALLIDUM AB SER QL IA: NEGATIVE
T3 SERPL-MCNC: 189 NG/DL
T3FREE SERPL-MCNC: 2.56 PG/ML
T4 FREE SERPL-MCNC: 1.2 NG/DL
TSH SERPL-ACNC: 1.52 UIU/ML
UROBILINOGEN URINE: 0.2 (ref 0.2–?)
VZV AB TITR SER: POSITIVE
VZV IGG SER IF-ACNC: 10.2 S/CO
WBC # FLD AUTO: 8.88 K/UL

## 2024-10-14 ENCOUNTER — TRANSCRIPTION ENCOUNTER (OUTPATIENT)
Age: 34
End: 2024-10-14

## 2024-10-14 ENCOUNTER — NON-APPOINTMENT (OUTPATIENT)
Age: 34
End: 2024-10-14

## 2024-10-15 ENCOUNTER — APPOINTMENT (OUTPATIENT)
Dept: OBGYN | Facility: CLINIC | Age: 34
End: 2024-10-15
Payer: COMMERCIAL

## 2024-10-15 VITALS
WEIGHT: 200 LBS | BODY MASS INDEX: 36.8 KG/M2 | DIASTOLIC BLOOD PRESSURE: 68 MMHG | SYSTOLIC BLOOD PRESSURE: 102 MMHG | HEIGHT: 62 IN

## 2024-10-15 DIAGNOSIS — Z34.93 ENCOUNTER FOR SUPERVISION OF NORMAL PREGNANCY, UNSPECIFIED, THIRD TRIMESTER: ICD-10-CM

## 2024-10-15 LAB
APPEARANCE: CLEAR
BILIRUBIN URINE: NEGATIVE
BLOOD URINE: NEGATIVE
COLOR: YELLOW
GLUCOSE QUALITATIVE U: NEGATIVE
KETONES URINE: NEGATIVE
LEUKOCYTE ESTERASE URINE: ABNORMAL
NITRITE URINE: NEGATIVE
PH URINE: 7
PROTEIN URINE: NEGATIVE
SPECIFIC GRAVITY URINE: 1.02
UROBILINOGEN URINE: 0.2 (ref 0.2–?)

## 2024-10-15 PROCEDURE — 0502F SUBSEQUENT PRENATAL CARE: CPT

## 2024-10-17 ENCOUNTER — NON-APPOINTMENT (OUTPATIENT)
Age: 34
End: 2024-10-17

## 2024-10-18 ENCOUNTER — APPOINTMENT (OUTPATIENT)
Dept: ANTEPARTUM | Facility: CLINIC | Age: 34
End: 2024-10-18
Payer: COMMERCIAL

## 2024-10-18 ENCOUNTER — ASOB RESULT (OUTPATIENT)
Age: 34
End: 2024-10-18

## 2024-10-18 PROCEDURE — 76816 OB US FOLLOW-UP PER FETUS: CPT

## 2024-10-18 PROCEDURE — 76819 FETAL BIOPHYS PROFIL W/O NST: CPT | Mod: 59

## 2024-10-23 ENCOUNTER — NON-APPOINTMENT (OUTPATIENT)
Age: 34
End: 2024-10-23

## 2024-10-28 ENCOUNTER — NON-APPOINTMENT (OUTPATIENT)
Age: 34
End: 2024-10-28

## 2024-10-29 ENCOUNTER — APPOINTMENT (OUTPATIENT)
Dept: OBGYN | Facility: CLINIC | Age: 34
End: 2024-10-29
Payer: COMMERCIAL

## 2024-10-29 VITALS — DIASTOLIC BLOOD PRESSURE: 70 MMHG | WEIGHT: 202 LBS | SYSTOLIC BLOOD PRESSURE: 118 MMHG | BODY MASS INDEX: 36.95 KG/M2

## 2024-10-29 DIAGNOSIS — Z34.93 ENCOUNTER FOR SUPERVISION OF NORMAL PREGNANCY, UNSPECIFIED, THIRD TRIMESTER: ICD-10-CM

## 2024-10-29 LAB
APPEARANCE: CLEAR
BILIRUBIN URINE: NEGATIVE
BLOOD URINE: NEGATIVE
COLOR: YELLOW
GLUCOSE QUALITATIVE U: NEGATIVE
KETONES URINE: NEGATIVE
LEUKOCYTE ESTERASE URINE: ABNORMAL
NITRITE URINE: NEGATIVE
PH URINE: 6.5
PROTEIN URINE: NEGATIVE
SPECIFIC GRAVITY URINE: <=1.005
UROBILINOGEN URINE: 0.2 (ref 0.2–?)

## 2024-10-29 PROCEDURE — 0502F SUBSEQUENT PRENATAL CARE: CPT

## 2024-10-29 PROCEDURE — 90471 IMMUNIZATION ADMIN: CPT

## 2024-10-29 PROCEDURE — 90715 TDAP VACCINE 7 YRS/> IM: CPT

## 2024-10-29 PROCEDURE — 36415 COLL VENOUS BLD VENIPUNCTURE: CPT

## 2024-11-06 LAB
HCT VFR BLD CALC: 37 %
HCV AB SER QL: NONREACTIVE
HCV S/CO RATIO: 0.06 S/CO
HGB BLD-MCNC: 12.3 G/DL
HIV1+2 AB SPEC QL IA.RAPID: NONREACTIVE
MCHC RBC-ENTMCNC: 30.1 PG
MCHC RBC-ENTMCNC: 33.2 G/DL
MCV RBC AUTO: 90.5 FL
PLATELET # BLD AUTO: 178 K/UL
RBC # BLD: 4.09 M/UL
RBC # FLD: 14.1 %
T PALLIDUM AB SER QL IA: NEGATIVE
WBC # FLD AUTO: 6.73 K/UL

## 2024-11-12 ENCOUNTER — APPOINTMENT (OUTPATIENT)
Dept: OBGYN | Facility: CLINIC | Age: 34
End: 2024-11-12
Payer: COMMERCIAL

## 2024-11-12 DIAGNOSIS — Z34.93 ENCOUNTER FOR SUPERVISION OF NORMAL PREGNANCY, UNSPECIFIED, THIRD TRIMESTER: ICD-10-CM

## 2024-11-12 LAB
APPEARANCE: CLEAR
BILIRUBIN URINE: NEGATIVE
BLOOD URINE: ABNORMAL
COLOR: YELLOW
GLUCOSE QUALITATIVE U: NEGATIVE
KETONES URINE: ABNORMAL
LEUKOCYTE ESTERASE URINE: ABNORMAL
NITRITE URINE: NEGATIVE
PH URINE: 5.5
PROTEIN URINE: NEGATIVE
SPECIFIC GRAVITY URINE: 1.02
UROBILINOGEN URINE: 0.2 (ref 0.2–?)

## 2024-11-12 PROCEDURE — 0502F SUBSEQUENT PRENATAL CARE: CPT

## 2024-11-14 ENCOUNTER — APPOINTMENT (OUTPATIENT)
Dept: ANTEPARTUM | Facility: CLINIC | Age: 34
End: 2024-11-14
Payer: COMMERCIAL

## 2024-11-14 ENCOUNTER — ASOB RESULT (OUTPATIENT)
Age: 34
End: 2024-11-14

## 2024-11-14 PROCEDURE — 76818 FETAL BIOPHYS PROFILE W/NST: CPT

## 2024-11-14 PROCEDURE — 76816 OB US FOLLOW-UP PER FETUS: CPT

## 2024-11-14 PROCEDURE — 99213 OFFICE O/P EST LOW 20 MIN: CPT | Mod: 25

## 2024-11-14 PROCEDURE — 76821 MIDDLE CEREBRAL ARTERY ECHO: CPT | Mod: 59

## 2024-11-14 PROCEDURE — ZZZZZ: CPT

## 2024-11-21 ENCOUNTER — ASOB RESULT (OUTPATIENT)
Age: 34
End: 2024-11-21

## 2024-11-21 ENCOUNTER — APPOINTMENT (OUTPATIENT)
Dept: ANTEPARTUM | Facility: CLINIC | Age: 34
End: 2024-11-21
Payer: COMMERCIAL

## 2024-11-21 PROCEDURE — 76819 FETAL BIOPHYS PROFIL W/O NST: CPT

## 2024-11-21 PROCEDURE — 76821 MIDDLE CEREBRAL ARTERY ECHO: CPT

## 2024-11-26 ENCOUNTER — NON-APPOINTMENT (OUTPATIENT)
Age: 34
End: 2024-11-26

## 2024-11-26 ENCOUNTER — APPOINTMENT (OUTPATIENT)
Dept: OBGYN | Facility: CLINIC | Age: 34
End: 2024-11-26
Payer: COMMERCIAL

## 2024-11-26 VITALS — SYSTOLIC BLOOD PRESSURE: 120 MMHG | WEIGHT: 210 LBS | BODY MASS INDEX: 38.41 KG/M2 | DIASTOLIC BLOOD PRESSURE: 80 MMHG

## 2024-11-26 LAB
APPEARANCE: CLEAR
BILIRUBIN URINE: NEGATIVE
BLOOD URINE: NEGATIVE
COLOR: YELLOW
GLUCOSE QUALITATIVE U: NEGATIVE
KETONES URINE: NEGATIVE
LEUKOCYTE ESTERASE URINE: ABNORMAL
NITRITE URINE: NEGATIVE
PH URINE: 6
PROTEIN URINE: NEGATIVE
SPECIFIC GRAVITY URINE: <=1.005
UROBILINOGEN URINE: 0.2 (ref 0.2–?)

## 2024-11-26 PROCEDURE — 0502F SUBSEQUENT PRENATAL CARE: CPT

## 2024-11-27 ENCOUNTER — APPOINTMENT (OUTPATIENT)
Dept: ANTEPARTUM | Facility: CLINIC | Age: 34
End: 2024-11-27
Payer: COMMERCIAL

## 2024-11-27 ENCOUNTER — ASOB RESULT (OUTPATIENT)
Age: 34
End: 2024-11-27

## 2024-11-27 PROCEDURE — 76818 FETAL BIOPHYS PROFILE W/NST: CPT

## 2024-11-29 LAB — B-HEM STREP SPEC QL CULT: NORMAL

## 2024-12-03 ENCOUNTER — APPOINTMENT (OUTPATIENT)
Dept: OBGYN | Facility: CLINIC | Age: 34
End: 2024-12-03
Payer: COMMERCIAL

## 2024-12-03 ENCOUNTER — NON-APPOINTMENT (OUTPATIENT)
Age: 34
End: 2024-12-03

## 2024-12-03 VITALS
SYSTOLIC BLOOD PRESSURE: 116 MMHG | DIASTOLIC BLOOD PRESSURE: 60 MMHG | HEIGHT: 62 IN | BODY MASS INDEX: 39.75 KG/M2 | WEIGHT: 216 LBS

## 2024-12-03 DIAGNOSIS — E27.8 OTHER SPECIFIED DISORDERS OF ADRENAL GLAND: ICD-10-CM

## 2024-12-03 PROCEDURE — 0502F SUBSEQUENT PRENATAL CARE: CPT

## 2024-12-04 ENCOUNTER — NON-APPOINTMENT (OUTPATIENT)
Age: 34
End: 2024-12-04

## 2024-12-05 ENCOUNTER — APPOINTMENT (OUTPATIENT)
Dept: ANTEPARTUM | Facility: CLINIC | Age: 34
End: 2024-12-05
Payer: COMMERCIAL

## 2024-12-05 ENCOUNTER — ASOB RESULT (OUTPATIENT)
Age: 34
End: 2024-12-05

## 2024-12-05 LAB
APPEARANCE: CLEAR
BILIRUBIN URINE: NEGATIVE
BLOOD URINE: ABNORMAL
COLOR: YELLOW
GLUCOSE QUALITATIVE U: NEGATIVE
KETONES URINE: NEGATIVE
LEUKOCYTE ESTERASE URINE: ABNORMAL
NITRITE URINE: NEGATIVE
PH URINE: 5.5
PROTEIN URINE: NEGATIVE
SPECIFIC GRAVITY URINE: <=1.005
UROBILINOGEN URINE: 0.2 (ref 0.2–?)

## 2024-12-05 PROCEDURE — 76821 MIDDLE CEREBRAL ARTERY ECHO: CPT

## 2024-12-05 PROCEDURE — 76819 FETAL BIOPHYS PROFIL W/O NST: CPT

## 2024-12-05 PROCEDURE — 99214 OFFICE O/P EST MOD 30 MIN: CPT | Mod: 25

## 2024-12-12 ENCOUNTER — APPOINTMENT (OUTPATIENT)
Dept: ANTEPARTUM | Facility: CLINIC | Age: 34
End: 2024-12-12
Payer: COMMERCIAL

## 2024-12-12 ENCOUNTER — APPOINTMENT (OUTPATIENT)
Dept: OBGYN | Facility: CLINIC | Age: 34
End: 2024-12-12

## 2024-12-12 ENCOUNTER — ASOB RESULT (OUTPATIENT)
Age: 34
End: 2024-12-12

## 2024-12-12 VITALS
DIASTOLIC BLOOD PRESSURE: 62 MMHG | WEIGHT: 219 LBS | BODY MASS INDEX: 40.3 KG/M2 | SYSTOLIC BLOOD PRESSURE: 114 MMHG | HEIGHT: 62 IN

## 2024-12-12 DIAGNOSIS — Z34.93 ENCOUNTER FOR SUPERVISION OF NORMAL PREGNANCY, UNSPECIFIED, THIRD TRIMESTER: ICD-10-CM

## 2024-12-12 LAB
APPEARANCE: CLEAR
BILIRUBIN URINE: NEGATIVE
BLOOD URINE: ABNORMAL
COLOR: YELLOW
GLUCOSE QUALITATIVE U: NEGATIVE
KETONES URINE: NEGATIVE
LEUKOCYTE ESTERASE URINE: ABNORMAL
NITRITE URINE: NEGATIVE
PH URINE: 5
PROTEIN URINE: NEGATIVE
SPECIFIC GRAVITY URINE: <=1.005
UROBILINOGEN URINE: 0.2 (ref 0.2–?)

## 2024-12-12 PROCEDURE — 76816 OB US FOLLOW-UP PER FETUS: CPT

## 2024-12-12 PROCEDURE — 0502F SUBSEQUENT PRENATAL CARE: CPT

## 2024-12-12 PROCEDURE — 76818 FETAL BIOPHYS PROFILE W/NST: CPT

## 2024-12-12 PROCEDURE — 76821 MIDDLE CEREBRAL ARTERY ECHO: CPT | Mod: 59

## 2024-12-14 RX ORDER — LEVOTHYROXINE SODIUM 300 MCG
137 TABLET ORAL DAILY
Refills: 0 | Status: DISCONTINUED | OUTPATIENT
Start: 2024-12-17 | End: 2024-12-21

## 2024-12-14 RX ORDER — OXYTOCIN-SODIUM CHLORIDE 0.9% IV SOLN 30 UNIT/500ML 30-0.9/5 UT/ML-%
SOLUTION INTRAVENOUS
Qty: 30 | Refills: 0 | Status: DISCONTINUED | OUTPATIENT
Start: 2024-12-17 | End: 2024-12-21

## 2024-12-14 RX ORDER — CITRIC ACID/SODIUM CITRATE 300-500 MG
30 SOLUTION, ORAL ORAL ONCE
Refills: 0 | Status: COMPLETED | OUTPATIENT
Start: 2024-12-17 | End: 2024-12-18

## 2024-12-14 RX ORDER — SODIUM CHLORIDE 9 G/1000ML
1000 INJECTION, SOLUTION INTRAVENOUS
Refills: 0 | Status: DISCONTINUED | OUTPATIENT
Start: 2024-12-17 | End: 2024-12-18

## 2024-12-17 ENCOUNTER — INPATIENT (INPATIENT)
Facility: HOSPITAL | Age: 34
LOS: 3 days | Discharge: HOME | DRG: 833 | End: 2024-12-21
Attending: OBSTETRICS & GYNECOLOGY | Admitting: OBSTETRICS & GYNECOLOGY
Payer: COMMERCIAL

## 2024-12-17 VITALS
HEART RATE: 113 BPM | WEIGHT: 216.05 LBS | HEIGHT: 62 IN | DIASTOLIC BLOOD PRESSURE: 62 MMHG | RESPIRATION RATE: 18 BRPM | SYSTOLIC BLOOD PRESSURE: 127 MMHG | TEMPERATURE: 98 F

## 2024-12-17 DIAGNOSIS — O99.280 ENDOCRINE, NUTRITIONAL AND METABOLIC DISEASES COMPLICATING PREGNANCY, UNSPECIFIED TRIMESTER: ICD-10-CM

## 2024-12-17 LAB
BASOPHILS # BLD AUTO: 0.01 K/UL — SIGNIFICANT CHANGE UP (ref 0–0.2)
BASOPHILS NFR BLD AUTO: 0.1 % — SIGNIFICANT CHANGE UP (ref 0–2)
BLD GP AB SCN SERPL QL: SIGNIFICANT CHANGE UP
EOSINOPHIL # BLD AUTO: 0.07 K/UL — SIGNIFICANT CHANGE UP (ref 0–0.5)
EOSINOPHIL NFR BLD AUTO: 1 % — SIGNIFICANT CHANGE UP (ref 0–6)
HCT VFR BLD CALC: 33.7 % — LOW (ref 34.5–45)
HGB BLD-MCNC: 11.8 G/DL — SIGNIFICANT CHANGE UP (ref 11.5–15.5)
IMM GRANULOCYTES NFR BLD AUTO: 0.7 % — SIGNIFICANT CHANGE UP (ref 0–0.9)
LYMPHOCYTES # BLD AUTO: 1.45 K/UL — SIGNIFICANT CHANGE UP (ref 1–3.3)
LYMPHOCYTES # BLD AUTO: 21.2 % — SIGNIFICANT CHANGE UP (ref 13–44)
MCHC RBC-ENTMCNC: 29.4 PG — SIGNIFICANT CHANGE UP (ref 27–34)
MCHC RBC-ENTMCNC: 35 G/DL — SIGNIFICANT CHANGE UP (ref 32–36)
MCV RBC AUTO: 83.8 FL — SIGNIFICANT CHANGE UP (ref 80–100)
MONOCYTES # BLD AUTO: 0.56 K/UL — SIGNIFICANT CHANGE UP (ref 0–0.9)
MONOCYTES NFR BLD AUTO: 8.2 % — SIGNIFICANT CHANGE UP (ref 2–14)
NEUTROPHILS # BLD AUTO: 4.69 K/UL — SIGNIFICANT CHANGE UP (ref 1.8–7.4)
NEUTROPHILS NFR BLD AUTO: 68.8 % — SIGNIFICANT CHANGE UP (ref 43–77)
PLATELET # BLD AUTO: 215 K/UL — SIGNIFICANT CHANGE UP (ref 150–400)
RBC # BLD: 4.02 M/UL — SIGNIFICANT CHANGE UP (ref 3.8–5.2)
RBC # FLD: 13.2 % — SIGNIFICANT CHANGE UP (ref 10.3–14.5)
T PALLIDUM AB TITR SER: NEGATIVE — SIGNIFICANT CHANGE UP
WBC # BLD: 6.83 K/UL — SIGNIFICANT CHANGE UP (ref 3.8–10.5)
WBC # FLD AUTO: 6.83 K/UL — SIGNIFICANT CHANGE UP (ref 3.8–10.5)

## 2024-12-17 RX ORDER — OXYTOCIN-SODIUM CHLORIDE 0.9% IV SOLN 30 UNIT/500ML 30-0.9/5 UT/ML-%
SOLUTION INTRAVENOUS
Qty: 30 | Refills: 0 | Status: DISCONTINUED | OUTPATIENT
Start: 2024-12-17 | End: 2024-12-18

## 2024-12-17 RX ORDER — ACETAMINOPHEN 500 MG/5ML
1000 LIQUID (ML) ORAL ONCE
Refills: 0 | Status: COMPLETED | OUTPATIENT
Start: 2024-12-17 | End: 2024-12-17

## 2024-12-17 RX ORDER — SODIUM CHLORIDE 9 G/1000ML
1000 INJECTION, SOLUTION INTRAVENOUS ONCE
Refills: 0 | Status: COMPLETED | OUTPATIENT
Start: 2024-12-17 | End: 2024-12-17

## 2024-12-17 RX ADMIN — OXYTOCIN-SODIUM CHLORIDE 0.9% IV SOLN 30 UNIT/500ML 2 MILLIUNIT(S)/MIN: 30-0.9/5 SOLUTION at 09:17

## 2024-12-17 RX ADMIN — Medication 400 MILLIGRAM(S): at 20:00

## 2024-12-17 RX ADMIN — SODIUM CHLORIDE 1000 MILLILITER(S): 9 INJECTION, SOLUTION INTRAVENOUS at 15:25

## 2024-12-17 RX ADMIN — SODIUM CHLORIDE 125 MILLILITER(S): 9 INJECTION, SOLUTION INTRAVENOUS at 07:05

## 2024-12-17 RX ADMIN — SODIUM CHLORIDE 125 MILLILITER(S): 9 INJECTION, SOLUTION INTRAVENOUS at 15:00

## 2024-12-18 ENCOUNTER — TRANSCRIPTION ENCOUNTER (OUTPATIENT)
Age: 34
End: 2024-12-18

## 2024-12-18 ENCOUNTER — NON-APPOINTMENT (OUTPATIENT)
Age: 34
End: 2024-12-18

## 2024-12-18 ENCOUNTER — RESULT REVIEW (OUTPATIENT)
Age: 34
End: 2024-12-18

## 2024-12-18 DEVICE — INTERCEED 5 X 6" XL: Type: IMPLANTABLE DEVICE | Status: FUNCTIONAL

## 2024-12-18 RX ORDER — SODIUM CHLORIDE 9 G/1000ML
1000 INJECTION, SOLUTION INTRAVENOUS
Refills: 0 | Status: DISCONTINUED | OUTPATIENT
Start: 2024-12-18 | End: 2024-12-21

## 2024-12-18 RX ORDER — DIPHENHYDRAMINE HCL 12.5MG/5ML
25 ELIXIR ORAL EVERY 6 HOURS
Refills: 0 | Status: DISCONTINUED | OUTPATIENT
Start: 2024-12-18 | End: 2024-12-21

## 2024-12-18 RX ORDER — IBUPROFEN 200 MG
600 TABLET ORAL EVERY 6 HOURS
Refills: 0 | Status: COMPLETED | OUTPATIENT
Start: 2024-12-18 | End: 2025-11-16

## 2024-12-18 RX ORDER — OXYTOCIN-SODIUM CHLORIDE 0.9% IV SOLN 30 UNIT/500ML 30-0.9/5 UT/ML-%
42 SOLUTION INTRAVENOUS
Qty: 30 | Refills: 0 | Status: COMPLETED | OUTPATIENT
Start: 2024-12-18 | End: 2024-12-18

## 2024-12-18 RX ORDER — HYDROMORPHONE/SOD CHLOR,ISO/PF 2 MG/10 ML
2 SYRINGE (ML) INJECTION EVERY 4 HOURS
Refills: 0 | Status: DISCONTINUED | OUTPATIENT
Start: 2024-12-18 | End: 2024-12-21

## 2024-12-18 RX ORDER — ACETAMINOPHEN 500 MG/5ML
975 LIQUID (ML) ORAL ONCE
Refills: 0 | Status: COMPLETED | OUTPATIENT
Start: 2024-12-18 | End: 2024-12-18

## 2024-12-18 RX ORDER — MODIFIED LANOLIN 100 %
1 CREAM (GRAM) TOPICAL EVERY 6 HOURS
Refills: 0 | Status: DISCONTINUED | OUTPATIENT
Start: 2024-12-18 | End: 2024-12-21

## 2024-12-18 RX ORDER — OXYCODONE HYDROCHLORIDE 30 MG/1
5 TABLET ORAL
Refills: 0 | Status: DISCONTINUED | OUTPATIENT
Start: 2024-12-18 | End: 2024-12-18

## 2024-12-18 RX ORDER — CITRIC ACID/SODIUM CITRATE 300-500 MG
30 SOLUTION, ORAL ORAL ONCE
Refills: 0 | Status: DISCONTINUED | OUTPATIENT
Start: 2024-12-18 | End: 2024-12-21

## 2024-12-18 RX ORDER — KETOROLAC TROMETHAMINE 30 MG/ML
30 INJECTION, SOLUTION INTRAMUSCULAR; INTRAVENOUS EVERY 6 HOURS
Refills: 0 | Status: DISCONTINUED | OUTPATIENT
Start: 2024-12-18 | End: 2024-12-19

## 2024-12-18 RX ORDER — CEFAZOLIN SODIUM IN 0.9 % NACL 3 G/100 ML
2000 INTRAVENOUS SOLUTION, PIGGYBACK (ML) INTRAVENOUS ONCE
Refills: 0 | Status: DISCONTINUED | OUTPATIENT
Start: 2024-12-18 | End: 2024-12-18

## 2024-12-18 RX ORDER — MAGNESIUM HYDROXIDE 400 MG/5ML
30 SUSPENSION ORAL
Refills: 0 | Status: DISCONTINUED | OUTPATIENT
Start: 2024-12-18 | End: 2024-12-21

## 2024-12-18 RX ORDER — CEFAZOLIN SODIUM IN 0.9 % NACL 3 G/100 ML
2000 INTRAVENOUS SOLUTION, PIGGYBACK (ML) INTRAVENOUS ONCE
Refills: 0 | Status: COMPLETED | OUTPATIENT
Start: 2024-12-18 | End: 2024-12-18

## 2024-12-18 RX ORDER — SIMETHICONE 80 MG
80 TABLET,CHEWABLE ORAL EVERY 4 HOURS
Refills: 0 | Status: DISCONTINUED | OUTPATIENT
Start: 2024-12-18 | End: 2024-12-21

## 2024-12-18 RX ORDER — OXYCODONE HYDROCHLORIDE 30 MG/1
5 TABLET ORAL ONCE
Refills: 0 | Status: DISCONTINUED | OUTPATIENT
Start: 2024-12-18 | End: 2024-12-18

## 2024-12-18 RX ORDER — ENOXAPARIN SODIUM 100 MG/ML
40 INJECTION SUBCUTANEOUS EVERY 24 HOURS
Refills: 0 | Status: DISCONTINUED | OUTPATIENT
Start: 2024-12-18 | End: 2024-12-21

## 2024-12-18 RX ORDER — HYDROMORPHONE/SOD CHLOR,ISO/PF 2 MG/10 ML
1 SYRINGE (ML) INJECTION ONCE
Refills: 0 | Status: DISCONTINUED | OUTPATIENT
Start: 2024-12-18 | End: 2024-12-21

## 2024-12-18 RX ORDER — ACETAMINOPHEN 500 MG/5ML
975 LIQUID (ML) ORAL
Refills: 0 | Status: DISCONTINUED | OUTPATIENT
Start: 2024-12-18 | End: 2024-12-21

## 2024-12-18 RX ORDER — HYDROMORPHONE/SOD CHLOR,ISO/PF 2 MG/10 ML
1 SYRINGE (ML) INJECTION EVERY 4 HOURS
Refills: 0 | Status: DISCONTINUED | OUTPATIENT
Start: 2024-12-18 | End: 2024-12-21

## 2024-12-18 RX ORDER — AZITHROMYCIN 250 MG
500 CAPSULE ORAL ONCE
Refills: 0 | Status: COMPLETED | OUTPATIENT
Start: 2024-12-18 | End: 2024-12-18

## 2024-12-18 RX ORDER — SCOPOLAMINE 1 MG/3D
1 PATCH, EXTENDED RELEASE TRANSDERMAL ONCE
Refills: 0 | Status: COMPLETED | OUTPATIENT
Start: 2024-12-18 | End: 2024-12-18

## 2024-12-18 RX ADMIN — Medication 975 MILLIGRAM(S): at 14:22

## 2024-12-18 RX ADMIN — KETOROLAC TROMETHAMINE 30 MILLIGRAM(S): 30 INJECTION, SOLUTION INTRAMUSCULAR; INTRAVENOUS at 23:17

## 2024-12-18 RX ADMIN — Medication 20 MILLIGRAM(S): at 04:55

## 2024-12-18 RX ADMIN — KETOROLAC TROMETHAMINE 30 MILLIGRAM(S): 30 INJECTION, SOLUTION INTRAMUSCULAR; INTRAVENOUS at 17:57

## 2024-12-18 RX ADMIN — OXYTOCIN-SODIUM CHLORIDE 0.9% IV SOLN 30 UNIT/500ML 42 MILLIUNIT(S)/MIN: 30-0.9/5 SOLUTION at 12:41

## 2024-12-18 RX ADMIN — KETOROLAC TROMETHAMINE 30 MILLIGRAM(S): 30 INJECTION, SOLUTION INTRAMUSCULAR; INTRAVENOUS at 06:15

## 2024-12-18 RX ADMIN — Medication 255 MILLIGRAM(S): at 05:55

## 2024-12-18 RX ADMIN — Medication 975 MILLIGRAM(S): at 04:56

## 2024-12-18 RX ADMIN — ENOXAPARIN SODIUM 40 MILLIGRAM(S): 100 INJECTION SUBCUTANEOUS at 17:57

## 2024-12-18 RX ADMIN — SCOPOLAMINE 1 PATCH: 1 PATCH, EXTENDED RELEASE TRANSDERMAL at 04:50

## 2024-12-18 RX ADMIN — Medication 2000 MILLIGRAM(S): at 05:50

## 2024-12-18 RX ADMIN — Medication 30 MILLILITER(S): at 04:54

## 2024-12-18 RX ADMIN — Medication 80 MILLIGRAM(S): at 20:28

## 2024-12-18 RX ADMIN — Medication 975 MILLIGRAM(S): at 20:27

## 2024-12-19 ENCOUNTER — APPOINTMENT (OUTPATIENT)
Dept: ANTEPARTUM | Facility: CLINIC | Age: 34
End: 2024-12-19

## 2024-12-19 ENCOUNTER — APPOINTMENT (OUTPATIENT)
Dept: OBGYN | Facility: CLINIC | Age: 34
End: 2024-12-19

## 2024-12-19 LAB
BASOPHILS # BLD AUTO: 0.02 K/UL — SIGNIFICANT CHANGE UP (ref 0–0.2)
BASOPHILS NFR BLD AUTO: 0.2 % — SIGNIFICANT CHANGE UP (ref 0–2)
EOSINOPHIL # BLD AUTO: 0.04 K/UL — SIGNIFICANT CHANGE UP (ref 0–0.5)
EOSINOPHIL NFR BLD AUTO: 0.4 % — SIGNIFICANT CHANGE UP (ref 0–6)
HCT VFR BLD CALC: 28.1 % — LOW (ref 34.5–45)
HGB BLD-MCNC: 9.3 G/DL — LOW (ref 11.5–15.5)
IMM GRANULOCYTES NFR BLD AUTO: 0.6 % — SIGNIFICANT CHANGE UP (ref 0–0.9)
LYMPHOCYTES # BLD AUTO: 1.32 K/UL — SIGNIFICANT CHANGE UP (ref 1–3.3)
LYMPHOCYTES # BLD AUTO: 12.1 % — LOW (ref 13–44)
MCHC RBC-ENTMCNC: 29 PG — SIGNIFICANT CHANGE UP (ref 27–34)
MCHC RBC-ENTMCNC: 33.1 G/DL — SIGNIFICANT CHANGE UP (ref 32–36)
MCV RBC AUTO: 87.5 FL — SIGNIFICANT CHANGE UP (ref 80–100)
MONOCYTES # BLD AUTO: 1.04 K/UL — HIGH (ref 0–0.9)
MONOCYTES NFR BLD AUTO: 9.6 % — SIGNIFICANT CHANGE UP (ref 2–14)
NEUTROPHILS # BLD AUTO: 8.38 K/UL — HIGH (ref 1.8–7.4)
NEUTROPHILS NFR BLD AUTO: 77.1 % — HIGH (ref 43–77)
NON-GYNECOLOGICAL CYTOLOGY STUDY: SIGNIFICANT CHANGE UP
PLATELET # BLD AUTO: 191 K/UL — SIGNIFICANT CHANGE UP (ref 150–400)
RBC # BLD: 3.21 M/UL — LOW (ref 3.8–5.2)
RBC # FLD: 13.7 % — SIGNIFICANT CHANGE UP (ref 10.3–14.5)
WBC # BLD: 10.87 K/UL — HIGH (ref 3.8–10.5)
WBC # FLD AUTO: 10.87 K/UL — HIGH (ref 3.8–10.5)

## 2024-12-19 RX ORDER — IBUPROFEN 200 MG
1 TABLET ORAL
Qty: 28 | Refills: 0
Start: 2024-12-19 | End: 2024-12-25

## 2024-12-19 RX ORDER — IBUPROFEN 200 MG
600 TABLET ORAL EVERY 6 HOURS
Refills: 0 | Status: DISCONTINUED | OUTPATIENT
Start: 2024-12-19 | End: 2024-12-21

## 2024-12-19 RX ORDER — ACETAMINOPHEN 500 MG/5ML
3 LIQUID (ML) ORAL
Qty: 84 | Refills: 0
Start: 2024-12-19 | End: 2024-12-25

## 2024-12-19 RX ADMIN — Medication 975 MILLIGRAM(S): at 20:39

## 2024-12-19 RX ADMIN — KETOROLAC TROMETHAMINE 30 MILLIGRAM(S): 30 INJECTION, SOLUTION INTRAMUSCULAR; INTRAVENOUS at 05:25

## 2024-12-19 RX ADMIN — Medication 600 MILLIGRAM(S): at 12:27

## 2024-12-19 RX ADMIN — ENOXAPARIN SODIUM 40 MILLIGRAM(S): 100 INJECTION SUBCUTANEOUS at 17:27

## 2024-12-19 RX ADMIN — Medication 975 MILLIGRAM(S): at 02:10

## 2024-12-19 RX ADMIN — Medication 137 MICROGRAM(S): at 06:18

## 2024-12-19 RX ADMIN — Medication 975 MILLIGRAM(S): at 09:33

## 2024-12-19 RX ADMIN — Medication 80 MILLIGRAM(S): at 02:11

## 2024-12-19 RX ADMIN — Medication 600 MILLIGRAM(S): at 23:50

## 2024-12-19 RX ADMIN — Medication 600 MILLIGRAM(S): at 17:27

## 2024-12-19 RX ADMIN — Medication 975 MILLIGRAM(S): at 16:05

## 2024-12-20 RX ADMIN — Medication 975 MILLIGRAM(S): at 02:29

## 2024-12-20 RX ADMIN — Medication 600 MILLIGRAM(S): at 18:09

## 2024-12-20 RX ADMIN — Medication 600 MILLIGRAM(S): at 11:49

## 2024-12-20 RX ADMIN — Medication 600 MILLIGRAM(S): at 05:17

## 2024-12-20 RX ADMIN — ENOXAPARIN SODIUM 40 MILLIGRAM(S): 100 INJECTION SUBCUTANEOUS at 18:09

## 2024-12-20 RX ADMIN — Medication 137 MICROGRAM(S): at 05:33

## 2024-12-20 RX ADMIN — Medication 975 MILLIGRAM(S): at 15:30

## 2024-12-20 RX ADMIN — Medication 600 MILLIGRAM(S): at 23:56

## 2024-12-20 RX ADMIN — Medication 975 MILLIGRAM(S): at 09:17

## 2024-12-20 RX ADMIN — Medication 975 MILLIGRAM(S): at 20:22

## 2024-12-21 VITALS
RESPIRATION RATE: 18 BRPM | DIASTOLIC BLOOD PRESSURE: 83 MMHG | SYSTOLIC BLOOD PRESSURE: 123 MMHG | HEART RATE: 90 BPM | OXYGEN SATURATION: 98 % | TEMPERATURE: 98 F

## 2024-12-21 RX ADMIN — Medication 600 MILLIGRAM(S): at 06:09

## 2024-12-21 RX ADMIN — Medication 137 MICROGRAM(S): at 06:09

## 2024-12-21 RX ADMIN — Medication 600 MILLIGRAM(S): at 11:25

## 2024-12-21 RX ADMIN — Medication 975 MILLIGRAM(S): at 02:28

## 2024-12-21 RX ADMIN — Medication 975 MILLIGRAM(S): at 09:11

## 2024-12-23 ENCOUNTER — NON-APPOINTMENT (OUTPATIENT)
Age: 34
End: 2024-12-23

## 2024-12-23 PROBLEM — E03.9 HYPOTHYROIDISM, UNSPECIFIED: Chronic | Status: ACTIVE | Noted: 2024-12-17

## 2024-12-23 PROBLEM — N83.209 UNSPECIFIED OVARIAN CYST, UNSPECIFIED SIDE: Chronic | Status: ACTIVE | Noted: 2024-12-17

## 2024-12-26 ENCOUNTER — NON-APPOINTMENT (OUTPATIENT)
Age: 34
End: 2024-12-26

## 2024-12-27 ENCOUNTER — APPOINTMENT (OUTPATIENT)
Dept: OBGYN | Facility: CLINIC | Age: 34
End: 2024-12-27
Payer: COMMERCIAL

## 2024-12-27 VITALS
DIASTOLIC BLOOD PRESSURE: 82 MMHG | SYSTOLIC BLOOD PRESSURE: 124 MMHG | HEIGHT: 62 IN | BODY MASS INDEX: 34.41 KG/M2 | WEIGHT: 187 LBS

## 2024-12-27 DIAGNOSIS — K62.5 HEMORRHAGE OF ANUS AND RECTUM: ICD-10-CM

## 2024-12-27 PROCEDURE — 0503F POSTPARTUM CARE VISIT: CPT

## 2024-12-29 PROBLEM — K62.5 RECTAL BLEEDING: Status: ACTIVE | Noted: 2024-12-29

## 2025-01-03 ENCOUNTER — APPOINTMENT (OUTPATIENT)
Dept: OBGYN | Facility: CLINIC | Age: 35
End: 2025-01-03
Payer: COMMERCIAL

## 2025-01-03 VITALS
BODY MASS INDEX: 33.49 KG/M2 | HEIGHT: 62 IN | WEIGHT: 182 LBS | DIASTOLIC BLOOD PRESSURE: 74 MMHG | SYSTOLIC BLOOD PRESSURE: 112 MMHG

## 2025-01-03 DIAGNOSIS — O09.32 SUPERVISION OF PREGNANCY WITH INSUFFICIENT ANTENATAL CARE, SECOND TRIMESTER: ICD-10-CM

## 2025-01-03 DIAGNOSIS — O99.280 ENDOCRINE, NUTRITIONAL AND METABOLIC DISEASES COMPLICATING PREGNANCY, UNSPECIFIED TRIMESTER: ICD-10-CM

## 2025-01-03 DIAGNOSIS — Z3A.09 9 WEEKS GESTATION OF PREGNANCY: ICD-10-CM

## 2025-01-03 DIAGNOSIS — Z34.93 ENCOUNTER FOR SUPERVISION OF NORMAL PREGNANCY, UNSPECIFIED, THIRD TRIMESTER: ICD-10-CM

## 2025-01-03 DIAGNOSIS — O99.281 ENDOCRINE, NUTRITIONAL AND METABOLIC DISEASES COMPLICATING PREGNANCY, FIRST TRIMESTER: ICD-10-CM

## 2025-01-03 DIAGNOSIS — E03.9 ENDOCRINE, NUTRITIONAL AND METABOLIC DISEASES COMPLICATING PREGNANCY, FIRST TRIMESTER: ICD-10-CM

## 2025-01-03 DIAGNOSIS — E03.9 ENDOCRINE, NUTRITIONAL AND METABOLIC DISEASES COMPLICATING PREGNANCY, UNSPECIFIED TRIMESTER: ICD-10-CM

## 2025-01-03 DIAGNOSIS — O99.210 OBESITY COMPLICATING PREGNANCY, UNSPECIFIED TRIMESTER: ICD-10-CM

## 2025-01-03 DIAGNOSIS — Z3A.14 14 WEEKS GESTATION OF PREGNANCY: ICD-10-CM

## 2025-01-03 PROCEDURE — 99459 PELVIC EXAMINATION: CPT

## 2025-01-03 PROCEDURE — 99214 OFFICE O/P EST MOD 30 MIN: CPT

## 2025-01-03 RX ORDER — AMOXICILLIN AND CLAVULANATE POTASSIUM 500; 125 MG/1; MG/1
500-125 TABLET, FILM COATED ORAL
Qty: 14 | Refills: 0 | Status: ACTIVE | COMMUNITY
Start: 2025-01-03 | End: 1900-01-01

## 2025-01-05 PROBLEM — Z34.93 THIRD TRIMESTER PREGNANCY: Status: RESOLVED | Noted: 2024-10-15 | Resolved: 2025-01-05

## 2025-01-05 PROBLEM — O99.280 HYPOTHYROIDISM IN PREGNANCY: Status: RESOLVED | Noted: 2024-05-17 | Resolved: 2025-01-05

## 2025-01-05 PROBLEM — O09.32 INITIAL OBSTETRIC VISIT IN SECOND TRIMESTER: Status: RESOLVED | Noted: 2024-09-24 | Resolved: 2025-01-05

## 2025-01-05 PROBLEM — Z3A.14 14 WEEKS GESTATION OF PREGNANCY: Status: RESOLVED | Noted: 2024-06-20 | Resolved: 2025-01-05

## 2025-01-05 PROBLEM — O99.281 HYPOTHYROIDISM DURING PREGNANCY IN FIRST TRIMESTER: Status: RESOLVED | Noted: 2024-05-17 | Resolved: 2025-01-05

## 2025-01-05 PROBLEM — Z3A.09 9 WEEKS GESTATION OF PREGNANCY: Status: RESOLVED | Noted: 2024-05-17 | Resolved: 2025-01-05

## 2025-01-05 PROBLEM — O99.210 OBESITY IN PREGNANCY: Status: RESOLVED | Noted: 2024-05-17 | Resolved: 2025-01-05

## 2025-01-08 ENCOUNTER — APPOINTMENT (OUTPATIENT)
Dept: OBGYN | Facility: CLINIC | Age: 35
End: 2025-01-08
Payer: COMMERCIAL

## 2025-01-08 ENCOUNTER — NON-APPOINTMENT (OUTPATIENT)
Age: 35
End: 2025-01-08

## 2025-01-08 VITALS
HEIGHT: 62 IN | DIASTOLIC BLOOD PRESSURE: 70 MMHG | SYSTOLIC BLOOD PRESSURE: 112 MMHG | WEIGHT: 182 LBS | BODY MASS INDEX: 33.49 KG/M2

## 2025-01-08 DIAGNOSIS — T81.49XA INFECTION FOLLOWING A PROCEDURE, OTHER SURGICAL SITE, INITIAL ENCOUNTER: ICD-10-CM

## 2025-01-08 PROCEDURE — 99213 OFFICE O/P EST LOW 20 MIN: CPT

## 2025-01-14 ENCOUNTER — APPOINTMENT (OUTPATIENT)
Dept: COLORECTAL SURGERY | Facility: CLINIC | Age: 35
End: 2025-01-14
Payer: COMMERCIAL

## 2025-01-14 DIAGNOSIS — K64.8 OTHER HEMORRHOIDS: ICD-10-CM

## 2025-01-14 DIAGNOSIS — K64.4 RESIDUAL HEMORRHOIDAL SKIN TAGS: ICD-10-CM

## 2025-01-14 PROCEDURE — 46600 DIAGNOSTIC ANOSCOPY SPX: CPT

## 2025-01-14 PROCEDURE — 99203 OFFICE O/P NEW LOW 30 MIN: CPT | Mod: 25

## 2025-01-14 RX ORDER — HYDROCORTISONE ACETATE 25 MG/1
25 SUPPOSITORY RECTAL
Qty: 7 | Refills: 0 | Status: ACTIVE | COMMUNITY
Start: 2025-01-14 | End: 1900-01-01

## 2025-01-17 ENCOUNTER — NON-APPOINTMENT (OUTPATIENT)
Age: 35
End: 2025-01-17

## 2025-01-17 LAB — SURGICAL PATHOLOGY STUDY: SIGNIFICANT CHANGE UP

## 2025-01-20 ENCOUNTER — APPOINTMENT (OUTPATIENT)
Dept: OBGYN | Facility: CLINIC | Age: 35
End: 2025-01-20

## 2025-01-30 ENCOUNTER — NON-APPOINTMENT (OUTPATIENT)
Age: 35
End: 2025-01-30

## 2025-01-31 ENCOUNTER — APPOINTMENT (OUTPATIENT)
Dept: GYNECOLOGIC ONCOLOGY | Facility: CLINIC | Age: 35
End: 2025-01-31

## 2025-01-31 VITALS
HEART RATE: 90 BPM | BODY MASS INDEX: 33.13 KG/M2 | HEIGHT: 62 IN | DIASTOLIC BLOOD PRESSURE: 79 MMHG | OXYGEN SATURATION: 97 % | WEIGHT: 180 LBS | SYSTOLIC BLOOD PRESSURE: 119 MMHG

## 2025-01-31 DIAGNOSIS — D39.11 NEOPLASM OF UNCERTAIN BEHAVIOR OF RIGHT OVARY: ICD-10-CM

## 2025-01-31 DIAGNOSIS — D39.12 NEOPLASM OF UNCERTAIN BEHAVIOR OF LEFT OVARY: ICD-10-CM

## 2025-01-31 PROCEDURE — 99214 OFFICE O/P EST MOD 30 MIN: CPT | Mod: 24

## 2025-02-04 ENCOUNTER — NON-APPOINTMENT (OUTPATIENT)
Age: 35
End: 2025-02-04

## 2025-02-06 ENCOUNTER — APPOINTMENT (OUTPATIENT)
Dept: OBGYN | Facility: CLINIC | Age: 35
End: 2025-02-06
Payer: COMMERCIAL

## 2025-02-06 VITALS
SYSTOLIC BLOOD PRESSURE: 114 MMHG | HEIGHT: 62 IN | WEIGHT: 177 LBS | BODY MASS INDEX: 32.57 KG/M2 | DIASTOLIC BLOOD PRESSURE: 70 MMHG

## 2025-02-06 PROCEDURE — 0503F POSTPARTUM CARE VISIT: CPT

## 2025-04-08 ENCOUNTER — OUTPATIENT (OUTPATIENT)
Dept: OUTPATIENT SERVICES | Facility: HOSPITAL | Age: 35
LOS: 1 days | End: 2025-04-08
Payer: COMMERCIAL

## 2025-04-08 ENCOUNTER — APPOINTMENT (OUTPATIENT)
Dept: ULTRASOUND IMAGING | Facility: CLINIC | Age: 35
End: 2025-04-08
Payer: COMMERCIAL

## 2025-04-08 DIAGNOSIS — D39.12 NEOPLASM OF UNCERTAIN BEHAVIOR OF LEFT OVARY: ICD-10-CM

## 2025-04-08 DIAGNOSIS — D39.11 NEOPLASM OF UNCERTAIN BEHAVIOR OF RIGHT OVARY: ICD-10-CM

## 2025-04-08 PROCEDURE — 76856 US EXAM PELVIC COMPLETE: CPT | Mod: 26

## 2025-04-08 PROCEDURE — 76856 US EXAM PELVIC COMPLETE: CPT

## 2025-04-08 PROCEDURE — 76830 TRANSVAGINAL US NON-OB: CPT

## 2025-04-08 PROCEDURE — 76830 TRANSVAGINAL US NON-OB: CPT | Mod: 26

## 2025-04-09 ENCOUNTER — NON-APPOINTMENT (OUTPATIENT)
Age: 35
End: 2025-04-09

## 2025-04-09 ENCOUNTER — APPOINTMENT (OUTPATIENT)
Dept: OBGYN | Facility: CLINIC | Age: 35
End: 2025-04-09
Payer: COMMERCIAL

## 2025-04-09 VITALS
HEIGHT: 62 IN | SYSTOLIC BLOOD PRESSURE: 108 MMHG | WEIGHT: 176 LBS | DIASTOLIC BLOOD PRESSURE: 64 MMHG | BODY MASS INDEX: 32.39 KG/M2

## 2025-04-09 DIAGNOSIS — Z01.419 ENCOUNTER FOR GYNECOLOGICAL EXAMINATION (GENERAL) (ROUTINE) W/OUT ABNORMAL FINDINGS: ICD-10-CM

## 2025-04-09 PROCEDURE — 99395 PREV VISIT EST AGE 18-39: CPT

## 2025-04-09 PROCEDURE — 99459 PELVIC EXAMINATION: CPT

## 2025-04-09 RX ORDER — LEVOTHYROXINE SODIUM 125 UG/1
125 CAPSULE ORAL
Refills: 0 | Status: ACTIVE | COMMUNITY
Start: 2025-04-09

## 2025-04-11 LAB
C TRACH RRNA SPEC QL NAA+PROBE: NOT DETECTED
HPV HIGH+LOW RISK DNA PNL CVX: NOT DETECTED
N GONORRHOEA RRNA SPEC QL NAA+PROBE: NOT DETECTED
SOURCE TP AMPLIFICATION: NORMAL

## 2025-04-18 LAB — CYTOLOGY CVX/VAG DOC THIN PREP: NORMAL

## 2025-04-24 ENCOUNTER — NON-APPOINTMENT (OUTPATIENT)
Age: 35
End: 2025-04-24

## 2025-04-24 ENCOUNTER — APPOINTMENT (OUTPATIENT)
Dept: GYNECOLOGIC ONCOLOGY | Facility: CLINIC | Age: 35
End: 2025-04-24

## 2025-04-24 VITALS
HEIGHT: 61.5 IN | RESPIRATION RATE: 16 BRPM | WEIGHT: 175 LBS | SYSTOLIC BLOOD PRESSURE: 121 MMHG | OXYGEN SATURATION: 96 % | BODY MASS INDEX: 32.62 KG/M2 | HEART RATE: 86 BPM | DIASTOLIC BLOOD PRESSURE: 77 MMHG

## 2025-04-24 PROCEDURE — 99459 PELVIC EXAMINATION: CPT

## 2025-04-24 PROCEDURE — 99214 OFFICE O/P EST MOD 30 MIN: CPT

## 2025-04-24 PROCEDURE — G2211 COMPLEX E/M VISIT ADD ON: CPT | Mod: NC

## 2025-07-10 ENCOUNTER — OUTPATIENT (OUTPATIENT)
Dept: OUTPATIENT SERVICES | Facility: HOSPITAL | Age: 35
LOS: 1 days | End: 2025-07-10
Payer: COMMERCIAL

## 2025-07-10 ENCOUNTER — APPOINTMENT (OUTPATIENT)
Dept: ULTRASOUND IMAGING | Facility: CLINIC | Age: 35
End: 2025-07-10
Payer: COMMERCIAL

## 2025-07-10 DIAGNOSIS — Z00.8 ENCOUNTER FOR OTHER GENERAL EXAMINATION: ICD-10-CM

## 2025-07-10 DIAGNOSIS — D39.12 NEOPLASM OF UNCERTAIN BEHAVIOR OF LEFT OVARY: ICD-10-CM

## 2025-07-10 DIAGNOSIS — D39.11 NEOPLASM OF UNCERTAIN BEHAVIOR OF RIGHT OVARY: ICD-10-CM

## 2025-07-10 PROCEDURE — 76830 TRANSVAGINAL US NON-OB: CPT | Mod: 26

## 2025-07-10 PROCEDURE — 76830 TRANSVAGINAL US NON-OB: CPT

## 2025-07-10 PROCEDURE — 76856 US EXAM PELVIC COMPLETE: CPT

## 2025-07-10 PROCEDURE — 76856 US EXAM PELVIC COMPLETE: CPT | Mod: 26

## 2025-07-18 ENCOUNTER — APPOINTMENT (OUTPATIENT)
Dept: GYNECOLOGIC ONCOLOGY | Facility: CLINIC | Age: 35
End: 2025-07-18
Payer: COMMERCIAL

## 2025-07-18 PROCEDURE — 99213 OFFICE O/P EST LOW 20 MIN: CPT | Mod: 93
